# Patient Record
Sex: MALE | Race: BLACK OR AFRICAN AMERICAN | NOT HISPANIC OR LATINO | URBAN - METROPOLITAN AREA
[De-identification: names, ages, dates, MRNs, and addresses within clinical notes are randomized per-mention and may not be internally consistent; named-entity substitution may affect disease eponyms.]

---

## 2019-08-09 ENCOUNTER — OUTPATIENT (OUTPATIENT)
Dept: OUTPATIENT SERVICES | Facility: HOSPITAL | Age: 54
LOS: 1 days | End: 2019-08-09
Payer: COMMERCIAL

## 2019-08-09 VITALS
RESPIRATION RATE: 16 BRPM | OXYGEN SATURATION: 96 % | HEIGHT: 72 IN | WEIGHT: 169.98 LBS | TEMPERATURE: 100 F | HEART RATE: 68 BPM | DIASTOLIC BLOOD PRESSURE: 63 MMHG | SYSTOLIC BLOOD PRESSURE: 116 MMHG

## 2019-08-09 DIAGNOSIS — R94.39 ABNORMAL RESULT OF OTHER CARDIOVASCULAR FUNCTION STUDY: ICD-10-CM

## 2019-08-09 DIAGNOSIS — K66.0 PERITONEAL ADHESIONS (POSTPROCEDURAL) (POSTINFECTION): Chronic | ICD-10-CM

## 2019-08-09 LAB
ANION GAP SERPL CALC-SCNC: 11 MMOL/L — SIGNIFICANT CHANGE UP (ref 5–17)
BUN SERPL-MCNC: 8 MG/DL — SIGNIFICANT CHANGE UP (ref 7–23)
CALCIUM SERPL-MCNC: 9.8 MG/DL — SIGNIFICANT CHANGE UP (ref 8.4–10.5)
CHLORIDE SERPL-SCNC: 105 MMOL/L — SIGNIFICANT CHANGE UP (ref 96–108)
CO2 SERPL-SCNC: 27 MMOL/L — SIGNIFICANT CHANGE UP (ref 22–31)
CREAT SERPL-MCNC: 0.98 MG/DL — SIGNIFICANT CHANGE UP (ref 0.5–1.3)
GLUCOSE SERPL-MCNC: 94 MG/DL — SIGNIFICANT CHANGE UP (ref 70–99)
HCT VFR BLD CALC: 44.8 % — SIGNIFICANT CHANGE UP (ref 39–50)
HGB BLD-MCNC: 15 G/DL — SIGNIFICANT CHANGE UP (ref 13–17)
MCHC RBC-ENTMCNC: 31.1 PG — SIGNIFICANT CHANGE UP (ref 27–34)
MCHC RBC-ENTMCNC: 33.5 GM/DL — SIGNIFICANT CHANGE UP (ref 32–36)
MCV RBC AUTO: 92.8 FL — SIGNIFICANT CHANGE UP (ref 80–100)
PLATELET # BLD AUTO: 201 K/UL — SIGNIFICANT CHANGE UP (ref 150–400)
POTASSIUM SERPL-MCNC: 4.1 MMOL/L — SIGNIFICANT CHANGE UP (ref 3.5–5.3)
POTASSIUM SERPL-SCNC: 4.1 MMOL/L — SIGNIFICANT CHANGE UP (ref 3.5–5.3)
RBC # BLD: 4.83 M/UL — SIGNIFICANT CHANGE UP (ref 4.2–5.8)
RBC # FLD: 12.2 % — SIGNIFICANT CHANGE UP (ref 10.3–14.5)
SODIUM SERPL-SCNC: 143 MMOL/L — SIGNIFICANT CHANGE UP (ref 135–145)
WBC # BLD: 9.2 K/UL — SIGNIFICANT CHANGE UP (ref 3.8–10.5)
WBC # FLD AUTO: 9.2 K/UL — SIGNIFICANT CHANGE UP (ref 3.8–10.5)

## 2019-08-09 PROCEDURE — 80048 BASIC METABOLIC PNL TOTAL CA: CPT

## 2019-08-09 PROCEDURE — 93458 L HRT ARTERY/VENTRICLE ANGIO: CPT

## 2019-08-09 PROCEDURE — 93005 ELECTROCARDIOGRAM TRACING: CPT

## 2019-08-09 PROCEDURE — C1769: CPT

## 2019-08-09 PROCEDURE — C1887: CPT

## 2019-08-09 PROCEDURE — C1894: CPT

## 2019-08-09 PROCEDURE — 99152 MOD SED SAME PHYS/QHP 5/>YRS: CPT

## 2019-08-09 PROCEDURE — 93010 ELECTROCARDIOGRAM REPORT: CPT

## 2019-08-09 PROCEDURE — 85027 COMPLETE CBC AUTOMATED: CPT

## 2019-08-09 RX ORDER — RAMIPRIL 5 MG
1 CAPSULE ORAL
Qty: 0 | Refills: 0 | DISCHARGE

## 2019-08-09 RX ORDER — CARVEDILOL PHOSPHATE 80 MG/1
1 CAPSULE, EXTENDED RELEASE ORAL
Qty: 0 | Refills: 0 | DISCHARGE

## 2019-08-09 NOTE — H&P CARDIOLOGY - PMH
Arthropathy  left knee in 2007  HTN (Hypertension)    Language barrier  native language Beninese Creole; comprehends and verbalizes English well  Murmur    Ventral hernia  January 2014

## 2019-08-09 NOTE — H&P CARDIOLOGY - HISTORY OF PRESENT ILLNESS
This is a 54 year old AA [place] /Creole male with PMHX of HTN , and Cardiomyopathy . No Implantable devices noted. Went to Dr. WELLINGTON his Cardiologist complaining of chest pain with exertion for the past 2weeks, relieved spontaneously with exertion . Pt had abnormal Stress test with abnormal results showing inferoapical defect EF 36% . Denies SOB, dizziness, syncope, increase in fatigue and decrease in exercise tolerance. Pt referred for C today . Currently CP free   Cardiac catheterization 2009: EF 45%, normal coronary arteries  < from: Cardiac Cath Lab (01.05.12 @ 17:40) >  Ventricles: Globalleft ventricular function was normal. EF estimated by  contrast ventriculography was 60 %.  Coronary vessels: The coronary circulation is right dominant.  LM:      LM: Normal.  LAD:      LAD: Normal.  CX:      Circumflex: Normal.  RCA:      RCA: Normal.  Complications: There were no complications.  Recommendations:  Medical management is recommended.  Prepared and signed by  Ramesh Mason M.D.  Signed 01/11/2012 18:35:13    < end of copied text >  < from: Transthoracic Echocardiogram w/ Doppler (12.04.09 @ 13:00) >  Conclusions:  1. Endocardium not well visualized; grossly mild global  left ventricular dysfunction.  Endocardial visualization  enhanced with intravenous injection of echo contrast  (Definity).  2. Grossly normal right ventricular size and systolic  function.  3. Minimal mitral regurgitation.  ------------------------------------------------------------------------  Confirmed on  12/4/2009 - 13:12:35 by Cain Ivory M.D.    < end of copied text > This is a 54 year old AA [place] /Creole male with PMHX of HTN , and Cardiomyopathy . No Implantable devices noted. Went to Dr. WELLINGTON his Cardiologist complaining of chest pain with exertion for the past 2weeks, relieved spontaneously with exertion . Pt had abnormal Stress test with abnormal results showing inferoapical defect EF 36% with LV dysfunction . Denies SOB, dizziness, syncope, increase in fatigue and decrease in exercise tolerance. Pt referred for [facility] today . Currently CP free   Cardiac catheterization 2009: EF 45%, normal coronary arteries  < from: Cardiac Cath Lab (01.05.12 @ 17:40) >  Ventricles: Globalleft ventricular function was normal. EF estimated by  contrast ventriculography was 60 %.  Coronary vessels: The coronary circulation is right dominant.  LM:      LM: Normal.  LAD:      LAD: Normal.  CX:      Circumflex: Normal.  RCA:      RCA: Normal.  Complications: There were no complications.  Recommendations:  Medical management is recommended.  Prepared and signed by  Ramesh Mason M.D.  Signed 01/11/2012 18:35:13    < end of copied text >  < from: Transthoracic Echocardiogram w/ Doppler (12.04.09 @ 13:00) >  Conclusions:  1. Endocardium not well visualized; grossly mild global  left ventricular dysfunction.  Endocardial visualization  enhanced with intravenous injection of echo contrast  (Definity).  2. Grossly normal right ventricular size and systolic  function.  3. Minimal mitral regurgitation.  ------------------------------------------------------------------------  Confirmed on  12/4/2009 - 13:12:35 by Cain Ivory M.D.    < end of copied text >

## 2022-08-19 ENCOUNTER — APPOINTMENT (OUTPATIENT)
Dept: UROLOGY | Facility: CLINIC | Age: 57
End: 2022-08-19

## 2022-08-19 VITALS — DIASTOLIC BLOOD PRESSURE: 80 MMHG | HEART RATE: 76 BPM | SYSTOLIC BLOOD PRESSURE: 125 MMHG | HEIGHT: 72 IN

## 2022-08-19 PROCEDURE — 99204 OFFICE O/P NEW MOD 45 MIN: CPT

## 2022-08-19 NOTE — PHYSICAL EXAM
[General Appearance - Well Developed] : well developed [] : no respiratory distress [Normal Station and Gait] : the gait and station were normal for the patient's age [Skin Color & Pigmentation] : normal skin color and pigmentation [No Focal Deficits] : no focal deficits [Oriented To Time, Place, And Person] : oriented to person, place, and time [Edema] : no peripheral edema [Abdomen Soft] : soft [Abdomen Tenderness] : non-tender [Costovertebral Angle Tenderness] : no ~M costovertebral angle tenderness [Urethral Meatus] : meatus normal [Penis Abnormality] : normal uncircumcised penis [Urinary Bladder Findings] : the bladder was normal on palpation [Scrotum] : the scrotum was normal [Testes Mass (___cm)] : there were no testicular masses [Rectal Exam - Rectum] : digital rectal exam was normal [No Prostate Nodules] : no prostate nodules [Prostate Size ___ (0-4)] : prostate size [unfilled] (scale: 0-4)

## 2022-08-19 NOTE — HISTORY OF PRESENT ILLNESS
[None] : None [FreeTextEntry1] : 57 yr old male presents to establish care. Cardiology referred patient to Urology, patient is unsure why. Pt complaining of back pain for two weeks. Pt denies any issues voiding. Pt denies dysuria or hematuria. Pt states that last he went to a Urologist in 2019- "PSA 3".  More recently, 5.03 2022\par \par Surgical hx: left arthroscopy for knee, hernia repair 2012\par Medical hx: "blood clot in heart" 2010\par Allergies: NKDA\par Social: Alcohol-occasionally, Smoking- never, Drug- never, Occupation-\par Family hx: noncontributory \par Medications: reviewed\par \par PSA hx\par \par 5.03 --7/2022\par 4.48--9/2021\par ~3 -- 2019 per pt reported [Dysuria] : no dysuria [Hematuria - Gross] : no gross hematuria

## 2022-08-19 NOTE — ASSESSMENT
[FreeTextEntry1] : Recheck today, with psa free and total\par \par Reviewed with pt- if sig elevation, and change over recent years, may need MRI and/or bx to eval for prostate cancer risks, but will assess with labs and discuss by phone.\par \par

## 2022-08-21 LAB
PSA FREE FLD-MCNC: 20 %
PSA FREE SERPL-MCNC: 1.19 NG/ML
PSA SERPL-MCNC: 6.08 NG/ML

## 2022-08-22 ENCOUNTER — NON-APPOINTMENT (OUTPATIENT)
Age: 57
End: 2022-08-22

## 2022-08-30 ENCOUNTER — APPOINTMENT (OUTPATIENT)
Dept: UROLOGY | Facility: CLINIC | Age: 57
End: 2022-08-30

## 2022-08-30 DIAGNOSIS — I50.22 CHRONIC SYSTOLIC (CONGESTIVE) HEART FAILURE: ICD-10-CM

## 2022-08-30 PROCEDURE — 99441: CPT

## 2022-08-30 NOTE — HISTORY OF PRESENT ILLNESS
[Other Location: e.g. School (Enter Location, City,State)___] : at [unfilled], at the time of the visit. [Other Location: e.g. Home (Enter Location, City,State)___] : at [unfilled] [Verbal consent obtained from patient] : the patient, [unfilled] [FreeTextEntry1] : The patient-doctor relationship has been established in a face to face fashion via real time video/audio HIPAA compliant communication using telemedicine software. The patient's identity has been confirmed. The patient was previously emailed a copy of the telemedicine consent. They have had a chance to review and has now given verbal consent and has requested care to be assessed and treated via telemedicine. They understand there may be limitations in this process, and that they may need further followup care in the office and/or hospital settings.\par \par Verbal consent given on Aug 30 2022 12:40PM\par \par JOSE MARKHAM is a 57 year M who presents for f/u of psa. Rising over last few years:\par \par PSA hx\par 6.08-- 8/21/22\par 5.03 --7/2022\par 4.48--9/2021\par ~3 -- 2019 per pt reported \par \par 08/30/2022 \par \par The patient denies fevers, chills, nausea and/or vomiting, and no unexplained weight loss.\par \par All pertinent parts of the patient PFSH (past medical, family, and social histories), laboratory, radiological studies and available physician notes were reviewed prior to starting the face-to-face portion of the telemedicine visit. Questionnaire results, where appropriate, were discussed with the patient.\par

## 2022-08-30 NOTE — ASSESSMENT
[FreeTextEntry1] : I had long discussion today with patient about the psa, digital exam, and the utility of prostate MRI in diagnosing and enhancing accuracy with rise of the PSA.\par \par We discussed implications as to biopsy options and directions based on the MRI, and future potential health risks\par \par He would like to proceed with:  arranging prostate MRI to assess risks, and review by appt after the films are done\par \par \par \par

## 2022-10-26 ENCOUNTER — APPOINTMENT (OUTPATIENT)
Dept: UROLOGY | Facility: CLINIC | Age: 57
End: 2022-10-26

## 2022-11-14 ENCOUNTER — APPOINTMENT (OUTPATIENT)
Dept: MRI IMAGING | Facility: IMAGING CENTER | Age: 57
End: 2022-11-14

## 2022-11-14 ENCOUNTER — RESULT REVIEW (OUTPATIENT)
Age: 57
End: 2022-11-14

## 2022-11-14 ENCOUNTER — OUTPATIENT (OUTPATIENT)
Dept: OUTPATIENT SERVICES | Facility: HOSPITAL | Age: 57
LOS: 1 days | End: 2022-11-14
Payer: COMMERCIAL

## 2022-11-14 DIAGNOSIS — Z00.8 ENCOUNTER FOR OTHER GENERAL EXAMINATION: ICD-10-CM

## 2022-11-14 DIAGNOSIS — R97.20 ELEVATED PROSTATE SPECIFIC ANTIGEN [PSA]: ICD-10-CM

## 2022-11-14 DIAGNOSIS — K66.0 PERITONEAL ADHESIONS (POSTPROCEDURAL) (POSTINFECTION): Chronic | ICD-10-CM

## 2022-11-14 PROCEDURE — 72197 MRI PELVIS W/O & W/DYE: CPT

## 2022-11-14 PROCEDURE — 76498P: CUSTOM | Mod: 26

## 2022-11-14 PROCEDURE — 72197 MRI PELVIS W/O & W/DYE: CPT | Mod: 26

## 2022-11-14 PROCEDURE — A9585: CPT

## 2022-11-14 PROCEDURE — 76498 UNLISTED MR PROCEDURE: CPT

## 2022-12-06 ENCOUNTER — APPOINTMENT (OUTPATIENT)
Dept: UROLOGY | Facility: CLINIC | Age: 57
End: 2022-12-06

## 2022-12-06 DIAGNOSIS — N40.0 BENIGN PROSTATIC HYPERPLASIA WITHOUT LOWER URINARY TRACT SYMPMS: ICD-10-CM

## 2022-12-06 DIAGNOSIS — R97.20 ELEVATED PROSTATE, SPECIFIC ANTIGEN [PSA]: ICD-10-CM

## 2022-12-06 PROCEDURE — 99442: CPT

## 2022-12-06 NOTE — ASSESSMENT
[FreeTextEntry1] : D/w pt at length today\par \par Although not previously reported, he believes he may have had a biopsy of prostate in the past. He relates that he took a test, was told he had a 1% risk, took a medication and was fine.\par \par I'm not at all sure what that means, and discussed that with him, as well as concerns if he had a biopsy in the past - would be critical to know results.\par \par Given his age, increasing psa, and intermediate MRI, options would be to proceed with fusion biopsy or to observe for now. I've recommended biopsy, but need to get additional information.\par \par Pt states urologist was in [place], and will try to obtain results, but doesn't want to proceed with anything else at this time.\par \par Will arrange rpa in 3 months to ensure opportunity to review if pt does not

## 2022-12-06 NOTE — HISTORY OF PRESENT ILLNESS
[Other Location: e.g. School (Enter Location, City,State)___] : at [unfilled], at the time of the visit. [Other Location: e.g. Home (Enter Location, City,State)___] : at [unfilled] [Verbal consent obtained from patient] : the patient, [unfilled] [FreeTextEntry1] : The patient-doctor relationship has been established in a face to face fashion via real time video/audio HIPAA compliant communication using telemedicine software. The patient's identity has been confirmed. The patient was previously emailed a copy of the telemedicine consent. They have had a chance to review and has now given verbal consent and has requested care to be assessed and treated via telemedicine. They understand there may be limitations in this process, and that they may need further followup care in the office and/or hospital settings.\par \par Verbal consent given on Dec  6 2022 11:20AM\par \par JOSE MARKHAM is a 57 year M who presents for f/u prostate MRI, elevation psa. Rising over last few years:\par \par PSA hx\par 6.08-- 8/21/22\par 5.03 --7/2022\par 4.48--9/2021\par ~3 -- 2019 per pt reported \par \par MRI done: prostate ~ 27 cc size. PSA density 0.23. PIRADs 3 for 4 x 6 mm lesion left posterior.\par \par 12/06/2022 \par \par The patient denies fevers, chills, nausea and/or vomiting, and no unexplained weight loss.\par \par All pertinent parts of the patient PFSH (past medical, family, and social histories), laboratory, radiological studies and available physician notes were reviewed prior to starting the face-to-face portion of the telemedicine visit. Questionnaire results, where appropriate, were discussed with the patient.\par

## 2023-03-17 ENCOUNTER — APPOINTMENT (OUTPATIENT)
Dept: UROLOGY | Facility: CLINIC | Age: 58
End: 2023-03-17

## 2023-04-20 ENCOUNTER — APPOINTMENT (OUTPATIENT)
Dept: UROLOGY | Facility: CLINIC | Age: 58
End: 2023-04-20